# Patient Record
Sex: MALE | Race: WHITE | Employment: FULL TIME | URBAN - METROPOLITAN AREA
[De-identification: names, ages, dates, MRNs, and addresses within clinical notes are randomized per-mention and may not be internally consistent; named-entity substitution may affect disease eponyms.]

---

## 2024-04-19 ENCOUNTER — HOSPITAL ENCOUNTER (OUTPATIENT)
Age: 39
Discharge: HOME | End: 2024-04-19
Payer: MEDICAID

## 2024-04-19 ENCOUNTER — APPOINTMENT (OUTPATIENT)
Dept: GENERAL RADIOLOGY | Age: 39
End: 2024-04-19
Attending: NURSE PRACTITIONER
Payer: MEDICAID

## 2024-04-19 VITALS
HEIGHT: 71 IN | HEART RATE: 102 BPM | SYSTOLIC BLOOD PRESSURE: 168 MMHG | WEIGHT: 290 LBS | BODY MASS INDEX: 40.6 KG/M2 | RESPIRATION RATE: 18 BRPM | OXYGEN SATURATION: 96 % | TEMPERATURE: 98 F | DIASTOLIC BLOOD PRESSURE: 118 MMHG

## 2024-04-19 DIAGNOSIS — S69.92XA INJURY OF FINGER OF LEFT HAND, INITIAL ENCOUNTER: Primary | ICD-10-CM

## 2024-04-19 DIAGNOSIS — L03.012 CELLULITIS OF FINGER OF LEFT HAND: ICD-10-CM

## 2024-04-19 PROCEDURE — 73140 X-RAY EXAM OF FINGER(S): CPT | Performed by: NURSE PRACTITIONER

## 2024-04-19 PROCEDURE — 99204 OFFICE O/P NEW MOD 45 MIN: CPT | Performed by: NURSE PRACTITIONER

## 2024-04-19 RX ORDER — CEFADROXIL 500 MG/1
500 CAPSULE ORAL 2 TIMES DAILY
Qty: 20 CAPSULE | Refills: 0 | Status: SHIPPED | OUTPATIENT
Start: 2024-04-19 | End: 2024-04-29

## 2024-04-19 RX ORDER — LISINOPRIL AND HYDROCHLOROTHIAZIDE 25; 20 MG/1; MG/1
1 TABLET ORAL DAILY
COMMUNITY
Start: 2022-09-19

## 2024-04-19 RX ORDER — AMLODIPINE BESYLATE 5 MG/1
5 TABLET ORAL DAILY
COMMUNITY
Start: 2024-03-14

## 2024-04-19 RX ORDER — IBUPROFEN 600 MG/1
600 TABLET ORAL ONCE
Status: COMPLETED | OUTPATIENT
Start: 2024-04-19 | End: 2024-04-19

## 2024-04-19 RX ORDER — FLUTICASONE PROPIONATE 50 MCG
SPRAY, SUSPENSION (ML) NASAL
COMMUNITY

## 2024-04-19 RX ORDER — DULOXETIN HYDROCHLORIDE 60 MG/1
60 CAPSULE, DELAYED RELEASE ORAL DAILY
COMMUNITY
Start: 2023-02-03

## 2024-04-19 RX ORDER — GLIPIZIDE 5 MG/1
5 TABLET, FILM COATED, EXTENDED RELEASE ORAL
COMMUNITY

## 2024-04-19 RX ORDER — CARVEDILOL 25 MG/1
25 TABLET ORAL 2 TIMES DAILY WITH MEALS
COMMUNITY
Start: 2024-04-12

## 2024-04-19 NOTE — ED PROVIDER NOTES
Patient Seen in: [facility]      History     Chief Complaint   Patient presents with    Cellulitis    Finger Pain     Stated Complaint: left hand/finger/arm pain    Subjective:   38-year-old male presents today with injury to the left index finger.  States was at work reaching for a pallet when he caught his finger on the palate.  Has a small scabbed over wound to the cuticle.  Does have surrounding redness and pain to the distal aspect of the finger.  Nails intact.  Tdap up-to-date.  No other symptoms or concerns.  The patient's medication list, past medical history and social history elements as listed in today's nurse's notes were reviewed and agreed (except as otherwise stated in the HPI).  The patient's family history reviewed and determined to be noncontributory to the presenting problem            Objective:   History reviewed. No pertinent past medical history.           No pertinent past surgical history.              No pertinent social history.            Review of Systems    Positive for stated complaint: left hand/finger/arm pain  Other systems are as noted in HPI.  Constitutional and vital signs reviewed.      All other systems reviewed and negative except as noted above.    Physical Exam     ED Triage Vitals [04/19/24 0831]   BP (!) 178/114   Pulse 102   Resp 18   Temp 97.9 °F (36.6 °C)   Temp src Temporal   SpO2 96 %   O2 Device None (Room air)       Current:BP (!) 168/118   Pulse 102   Temp 97.9 °F (36.6 °C) (Temporal)   Resp 18   Ht 180.3 cm (5' 11\")   Wt 131.5 kg   SpO2 96%   BMI 40.45 kg/m²         Physical Exam  Vitals and nursing note reviewed.   Constitutional:       Appearance: Normal appearance.   HENT:      Head: Normocephalic.      Mouth/Throat:      Mouth: Mucous membranes are moist.   Cardiovascular:      Rate and Rhythm: Normal rate.   Pulmonary:      Effort: Pulmonary effort is normal.   Musculoskeletal:      Comments: Redness swelling is noted to the distal aspect  of the left index finger.  Nails intact.  Scabbed over wound noted.  Area is nonfluctuant.  No felon   Skin:     General: Skin is warm and dry.   Neurological:      Mental Status: He is alert and oriented to person, place, and time.               ED Course   Labs Reviewed - No data to display                   MDM     Please note that this report has been produced using speech recognition software and may contain errors related to that system including, but not limited to, errors in grammar, punctuation, and spelling, as well as words and phrases that possibly may have been recognized inappropriately.  If there are any questions or concerns, contact the dictating provider for clarification.        Note to patient: The 21st Century Cures Act makes medical notes like these available to patients in the interest of transparency. However, this is a medical document intended as peer to peer communication. It is written in medical language and may contain abbreviations or verbiage that are unfamiliar. It may appear blunt or direct. Medical documents are intended to carry relevant information, facts as evident, and the clinical opinion of the practitioner.                                   Medical Decision Making  Differential diagnosis includes but is not limited to: Paronychia, cellulitis, julienne      Presents today with redness swelling to the distal aspect of the left index finger.  Patient injured his finger on a pallet while at work.  X-ray shows no bone injury or damage no foreign body.  Area was not fluctuant on exam.  Will treat with Duricef to take as directed.  Finger care instructions given.  To follow with primary care physician 1 week if symptoms do not improve.  Follow-up sooner if symptoms worsen.  Patient's blood pressure was elevated here today but states did not take his medications this morning.  Encouraged to take those immediately once he gets home.  And to monitor his blood pressure at home.  If  continues to be high to follow-up with his primary care physician.  If has any dizziness headaches numbness weakness change in vision any neurodeficits go directly to the ER.  Patient verbalized understanding and agreed to plan of care.    Amount and/or Complexity of Data Reviewed  Radiology: ordered and independent interpretation performed. Decision-making details documented in ED Course.     Details: X-ray left index finger    Risk  OTC drugs.  Prescription drug management.        Disposition and Plan     Clinical Impression:  1. Injury of finger of left hand, initial encounter    2. Cellulitis of finger of left hand         Disposition:  Discharge  4/19/2024  9:24 am    Follow-up:  Candace Driscoll MD  [address] [facility] [address]  [phone]    In 1 week  As needed          Medications Prescribed:  Discharge Medication List as of 4/19/2024  9:26 AM        START taking these medications    Details   cefadroxil 500 MG Oral Cap Take 1 capsule (500 mg total) by mouth 2 (two) times daily for 10 days., Normal, Disp-20 capsule, R-0

## 2024-04-19 NOTE — ED INITIAL ASSESSMENT (HPI)
Pt states he injured his left index finger on a pallet 3 or 4 days ago. Finger is swollen, red, tender, and pain radiates up his left arm to just below his elbow.